# Patient Record
Sex: FEMALE | Race: WHITE
[De-identification: names, ages, dates, MRNs, and addresses within clinical notes are randomized per-mention and may not be internally consistent; named-entity substitution may affect disease eponyms.]

---

## 2019-07-13 NOTE — NUR
According to the Suicide Assessment the patient rates low on the suicide
screen she denies thoughts of SI, but does say she is tired and stressed.

## 2019-07-14 NOTE — NUR
DR. MARRUFO NOTIFIED AND REVIEWED PTS BEHAVIOR AND ASSESSMENT RESULTS. PT IS A
LOW RISK PER DR. MARRUFO. DR. MARRUFO STATED TO GIVE RESOURCES TO PT AT TIME
OF DISCHARGE. NO FURTHER ORDERS AT THIS TIME. RESOURCES REVIEWED WITH PT AND
SHE VERBALIZED UNDERSTANDING.

## 2019-07-16 NOTE — NUR
ASSESSMENT PER FLOW, PT REPORTS JUST GETTING OUT OF SHOWER, SALINE LOCK
CONVERTED TO IV, NS RESTARTED PER MD ORDERS, SEE EMAR, PT REPORTS FLATUS, BM
TODAY AND VOIDING WITH NO DIFFICULTY, PT REPORTS BACK PAIN 4/10, SCD'S
REAPPLIED AND WORKING PROPERLY, DENIES NEEDS, BED IN LOW POSITION, SIDE RAILS
X 2, CALL LIGHT IN REACH

## 2019-07-16 NOTE — NUR
PT RINGS CALL LIGHT. THIS RN TO BEDSIDE. PT REQUESTING ADDITIONAL BLANKETS.
TEMP TAKEN 102.3 NOTED. PT EDUCATION GIVEN AND NO ADDITIONAL BLANKETS
PROVIDED. WHILE AT BEDSIDE, PT ASSISTED W/GETTING OUT OF BED, UP TO BR. VOIDS
150ML URINE. RETURNS TO BED. RECONNECTED TO MONITORS. PULSE OX OBTAINED,
PT'S PULSE OX WHEN OFF O2 IS 97%. NC O2 OFF AT THIS TIME. PT MEDICATED W/500MG
TYLENOL PO. DENIES FURTHER NEEDS AT THIS TIME.

## 2019-07-16 NOTE — NUR
REQUESTED ORANGE JUICE. ORANGE JUICE AND CUP GIVEN. GETTING UP TO THE BATHROOM
WILL PUT BACK ON MONITORS PER ORDER WHEN FINISHED.

## 2019-07-17 NOTE — NUR
ADM TYLENOL 500 MG PER MD ORDERS, COLD PACKS PLACED, SCD'S CONTINUE ON AND
WORKING PROPERLY, FOB AT BEDSIDE

## 2019-07-17 NOTE — NUR
PT ON CALL LIGHT, PT UP TO BR, GAIT STEADY, VOIDED 200 MLS LIGHT YELLOW URINE,
REPORTS HAVING TO "PEE" MORE FROM DRINKING WATER, PT BACK TO BED, TEMP
OBTAINED, 98.8 AT THIS TIME, PT DISCONNECTED FROM EFM/TOCO, PT REQUESTED AND
SERVED SANDWICH TRAY, SCD'S RECONNECTED AND WORKING PROPERLY, DENIES FURTHER
NEEDS, FOB ASLEEP ON COUCH

## 2019-07-17 NOTE — NUR
PT ON CALL LIGHT, PT STATES "Y'ALL SAID THESE COULD COME OFF IN 20 MINUTES",
EFM/TOCO REMOVED, PT UP TO BR WITH ASSISTANCE, VOIDED 200 MLS OF LIGHT YELLOW
URINE BY SELF WITH NO DIFFICULTY, PT BACK TO BED, SCD'S RECONNECTED AND
WORKING PROPERLY, FROM 0430 TO 0453 - 3 MILD CTX,  WITH ACCELS NOTED,
PT DENIES FURTHER NEEDS, BED IN LOW POSITION, SIDE RAILS X 2, CALL LIGHT IN
REACH, FOB ASLEEP ON COUCH

## 2019-07-17 NOTE — NUR
PT ON CALL LIGHT, PT UP TO BR WITH ASSISTANCE, VOIDED 250 MLS OF LIGHT YELLOW
URINE BY SELF WITH NO DIFFICULTY, PT BACK TO BED, PT REPORTS "FEELING COLD",
OBTAINED TEMP, TEMP 99.5, INFORMED PT THAT I WAS GOING TO PUT THE EFM/TOCO ON
HER AND PT REFUSED IT AT THIS TIME, STATES "THE DOCTOR TOLD ME ANYTHING UNDER
100 IS NOT A TEMP", TRIED TO EXPLAIN TO PT, BUT PT STILL REFUSES AT THIS TIME,
DENIES FURTHER NEEDS, SCD'S RECONNECTED AND WORKING PROPERLY, BED
IN LOW POSITION, SIDE RAILS X 2, CALL LIGHT IN REACH, FOB ASLEEP ON COUCH

## 2019-07-17 NOTE — NUR
DR PAREKH NOTIFIED, REPORT OF LUNG SOUNDS, TEMP, RESP, O2 SAT AND PLACED ON
2L O2 VIA NC, ORDERS TO DRAW BLOOD CULTURES, PLACED COLD PACKS, ADM TYLENOL
500 MG PO NOW, DC TYLENOL 500 MG Q6HPRN, AND CHANGE TO TYLENOL 1000MG PO
Q6HPRN FOR FEVER

## 2019-07-17 NOTE — NUR
PT ON CALL LIGHT, PT UP TO BR WITH ASSISTANCE, GAIT STEADY, VOIDED 500 MLS OF
YELLOW URINE BY SELF WITH NO DIFFICULTY, PT BACK TO BED, VS OBTAINED, EFM/TOCO
APPLIED, SCD'S RECONNECTED, PT POSITIONED TO LEFT SIDE WITH PILLOWS BEHIND
AROUND HER FOR COMFORT AND SUPPORT, PT DENIES FURTHER NEEDS

## 2019-07-17 NOTE — NUR
MARY SUE, RN REPORTS THAT PT'S TEMP .8, RESP 24-26, O2 SAT 94%, PT
PLACED ON 2L OF O2 VIA NC, LUNGS CLEAR WITH RIGHT LOWER DIMINISHED, WILL
NOTIFY DR PAREKH

## 2019-07-17 NOTE — NUR
PT ON CALL LIGHT, PT UP TO BR, GAIT STEADY, EMPTIED 500 MLS OF LIGHT
YELLOW URINE, PT REPORTS VOIDING EARLIER, PT VOIDED 500 MLS OF LIGHT YELLOW
URINE BY SELF WITH NO DIFFICULTY, PT BACK TO BED, SCD'S RECONNECTED AND
WORKING PROPERLY, EFM/TOCO RECONNECTED, PT DENIES FURTHER NEEDS

## 2019-07-18 NOTE — NUR
THIS RN TO BEDSIDE TO ADMINISTER REQUESTED AND SCHEDULED MEDS. PT NOW
REQUESTING TO GET UP TO AMBULATE IN THE HALLS. SCHEDULED PEPCID AND LOVENOX
INJECTION ADMINISTERED. SEE EMAR. PT THEN ASSISTED W/BEINGA BLE TO AMBULATE.
O2 TANK PROVIDED. O2 TUBING CONNECTED AND SET TO 6L. PT AMBULATES IN [place]
W/SIG OTHER. PT RETURNS PROMPTLY TO ROOM TO VOID AND THEN RETURNS TO HALLS TO
AMBULATE. PT HAS STEADY GATE AND DENIES FEELING DIZZY OR WEAK. WHILE PT IS UP
TO AMBULATE. BED LINENS CHANGED AND ROOM CLEANED AND STRAIGHTENED. HOUSE
KEEPING REMOVED TRASH AND LINEN.

## 2019-07-18 NOTE — NUR
PT RETURNS TO ROOM. REPORTS TOLERATED AMBULATING WELL. PT REPORTS SHE
AMBULATED TO WS DOOR WAY AND THEN TO VENDING MACHINES. PT RETURNS TO BED.
RECONNECTED TO FETAL MONITORS. SEE [facility] FOR FETAL TRACING. PT NOW
REQUEST AMBIEN AND FRESH ICE WATER. ICE WATER SERVED.

## 2019-07-18 NOTE — NUR
REC'D PT AA&O X 4. PAIN ASSESSED. PT REPORTS CONSTANT BACK PAIN. RATES PAIN
3/10. NO REQUEST FOR PAIN INTERVENTIONS AT THIS TIME. SEE FLOWSHEET FOR SHIFT
ASSESSMENT. BREATH SOUNDS CLEAR W/DIMINISHED BREATH SOUNDS NOTED TO LOWER RT
LOBE. BOWEL SOUNDS PRESENT. NO EDEMA NOTED. SCD WRAPS IN PLACE BILATERALLY.
CONNECTED TO PUMP AND PUMP IS ON AND FUNCTIONING. IV SITE TO LEFT WRIST. WNL.
PATENT W/NS INFUSING AT 50ML/HR. PM SHIFT POC DISCUSSED. PT VEBALZIES
UNDERSTANDING AND AGREEABLE. LOVENOX TEACHING PROVIDED AND QUESTIONS
ANSWERED.ADDITIONAL COMMENTS IN [facility]. BED LOW, SIDE RAILS UPX 2. CALL
LIGHT AND PHONE AT PT'S SIDE.

## 2019-07-18 NOTE — NUR
ROUNDS MADE. PT RESTING QUIETLY TO LEFT SIDE W/EYES CLOSED. OPENS EYES
SPONTANEOUSLY WITH VERBAL STIMULUS. EFM REPOSTIIONED FOR CONTINUOS FETAL
TRACING. NO NEEDS VOICED AT [facility]. NO C/O PAIN.

## 2019-07-18 NOTE — NUR
ROUNDS MADE. PT SITTING UP IN BED TALKING W/SIG OTHER. PAIN AND NEEDS
ASSESSED. NO PAIN INTERVENTIONS REQUESTED AT THIS TIME. PT REQUESTS HER AMBIEN
NOW.PT INFORMED THAT SHE HAS OTHER MEDS SCHEDULED AT 2100 AND IT WILL BE
BROUGHT AT THAT TIME. PT IS AGREEABLE. NO FURTHER NEEDS AT THIS TIME.

## 2019-07-18 NOTE — NUR
IV SITE WNL. VANCOMYCIN 1.25GM UP TO INFUSE AT 250ML/HR. AMBIEN 10MG PO GIVEN.
PT DENIES FURTHER NEEDS AT THIS TIME.

## 2019-07-19 NOTE — NUR
LINEN CHANGE DONE AND CLEAN GOWNS PLACED IN ROOM FOR PT TO CHANGE INTO
FOLLOWING SHOWER. SCD SLEEVES NOTED TO BE SOILED AND CLEAN ONES PROVIDE. CLEAN
NON SKID SOCKS ALSO PROVIDED. LINENS PROVIDED FOR SIGNIFICANT OTHER. PT
CONTINUES TO AMBULATE ON UNIT. CURRENTLY IN FRONT OF NBN WINDOW. TOLERATING
ACTIVITY WELL, DENIES SOB AND DIZZINESS. REINFORCE NOT LEAVING UNIT WHILE
AMBULATING, VERBALIZES UNDERSTANDING. CONTINUE TO NOTE STEAY GAIT.

## 2019-07-19 NOTE — NUR
ROUNDS MADE. PT LYING TO RT SIDE. RESP COUNT DONE. RESP 18. SNORING AUDIBLE.
PT LEFT UNDISTURBED AT THIS TIME.

## 2019-07-19 NOTE — NUR
OUT OF SHOWER. PT STATES THAT SHE IS GOING TO WALK TO HER CAR AND MAKE SURE IT
IS LOCKED. PT EDUCATED THAT SHE COULD AMBULATE ON UNIT BUT CAN NOT LEAVE UNIT
FOR AMBULATION WITHOUT STAFF MEMBER AND DEPENDING ON CENUS OF UNIT STAFF MAY
NOT BE AVAILABLE TO ACCOMPANY HER OFF UNIT. BECOMES TEARFUL WHEN GETTING BACK
IN BED AND STATES THAT SHE "JUST NEEDS 2-3 PUFFS OF A CIGARETTE TO HELP ME
CALM DOWN." REINFORCED SMOKING POLICY AS WELL EFFECTS OF SMOKING WHILE PREG
AND WITH PNEUMONIA, CONTINUES TO BE TEARFUL AND ASKS RN WHEN SHE CAN AMBULATE.
REINFORCED THAT SHE CAN AMBULATE WITH RN WHEN REACTIVE NST IS OBTAINED AS LONG
AS STAFFING PERMITS, VERBALIZES UNDERSTANDING. REFUSES SCD'S AT THIS TIME. BED
IN LOW POSITION WITH UPPER SIDE RAILS RAISED X2. CALL LIGHT AND PHONE WITHIN
REACH.

## 2019-07-19 NOTE — NUR
vancomycin 1.25gm up to infuse at 250ml/hr per orders. pt currently sitting up
in bed using cell phone. no needs voiced.

## 2019-07-19 NOTE — NUR
ROUNDS MADE. PT RESTING QUIETLY W/EYES CLOSED. OPENS EYES SPONTANEOUSLY
W/ENTRY TO THE ROOM RESP EVEN AND UNLABORED. NO COMPLAINTS OR REQUEST AT THIS
TIME.

## 2019-07-19 NOTE — NUR
xray to room at this time. request pt take bra off. this rn to bedside to
assist pt w/doing so. pt sitting up in bed. begins having a coughing spell.
able to cough og clear sputum. after several minutes, is up to br.

## 2019-07-19 NOTE — NUR
ROUNDS MADE. UPON ENTER ROOM, PT IS AA&O X 4 USING CELL PHONE. NO COMPLAINTS
AT THIS TIME. VITAL SIGNS OBTAINED AND CHARTED. PT IS AFEBRILE.

## 2019-07-19 NOTE — NUR
25 MG IM VISTARIL GIVEN IN RIGHT VENTROGLUTEAL. EDUCATED ON MEDICATION,
VERBALIZES UNDERSTANDING. 10 MG PO AMBIEN AND 20 MG PO PEPCID GIVEN PER ORDER.
1.25 GRAM VANC HUNG TO INFUSE OVER 1 HOUR PER ORDER TO RT FA PIV, INFUSING
WELL WITH NO S/S OF INFILTRATION NOTED. LOVENOX GIVEN TO RLL ABD QUADRANT.
CURRENTLY ON RA, SPOT CHECK FOR O2 SAT 97%. CELL PHONE IN HAND. REFUSES SCD'S.
REQUEST THAT V/S BE CHECKED WITH 0100 RT TREATMENT OR WHEN IV ANBX COMPLETES
SO SHE CAN SLEEP. RN ENSURED THAT THIS COULD BE DONE UNLESS NEED AROSE PRIOR
TO THESE TIMES, VERBALIZES UNDERSTANDING. BED IN LOW POSITION WITH UPPER SIDE
RAILS RAISED X2. CALL LIGHT AND PHONE WITHIN REACH. WILL CONTINUE TO MONITOR.

## 2019-07-19 NOTE — NUR
RT FA PIV FLUSH FOLLOWING ANBX COMPLETED AND PIV SL. NO S/S OF INFILTRATION
NOTED. UP TO BATHROOM TO VOID, 500 MLS CLEAR LIGHT YELLOW URINE NOTED IN HAT.
SMALL SOFT FORMED BM ALSO AT THIS TIME. O2 SAT ON RA FOLLOWING GETTING UP TO
BR 91%. PLACED ON 1 L O2 VIA HIGH FLOW NC WITH INCREASE OF O2 SAT TO 96-97%.
PT LAYING BACK IN SEMI-FOWLERS POSITION LOOKING ON CELL PHONE AT THIS TIME. O2
MONITOR OFF. DENIES NEEDS. O2 LEFT IN PLACE. REFUSES SCD'S. BED IN LOW
POSITION WITH UPPER SIDE RAILS RAISED X2. CALL LIGHT AND PHONE WITHIN REACH.
WILL CONTINUE TO MONITOR.

## 2019-07-19 NOTE — NUR
OOB AT 0045 TO GO TO BATHROOM. VOIDED 600 MLS CLEAR LIGHT YELLOW URINE IN HAT.
SMALL SOFT FORMED BM NOTED. UNSTEADY GAIT NOTED REQUIRING MINIMAL ASSIST.
EFM/TOCO AND SCD'S PLACED. DENIES NEEDS AT THIS TIME. RT AT BEDSIDE FOR
ORDERED UPDRAFT. BED IN LOW POSITION WITH UPPER SIDE RAILS RAISED X2. CALL
LIGHT AND PHONE WITHIN REACH.

## 2019-07-19 NOTE — NUR
WHILE AT BEDSIDE PT'S PULSE HAS DECREASED TO 93% WHILE ON 2L. O2 FLOW
INCREASED TO 5L TO MAINTAIN 95%. PT'S RESP RATE INCREASED AT THIS TIME TO 36
BREATHS PER MINUTE. NO C/O OF DIFFICULTY BREATHING. PT COUGHS UP BLOOD TINGED
YELLOWISH SPUTUM.

## 2019-07-19 NOTE — NUR
VANC INFUSION COMPLETED. NS SET TO INFUSE TO FLUSH IV LINE. PT RESTING WITH
EYES CLOSED AT THIS TIME. RESP 19, NO S/S OF DISTRESS NOTED. BED IN LOW
POSITION WITH UPPER SIDE RAILS RAISED X2. CALL LIGHT AND PHONE WITHIN REACH.
WILL CONTINUE TO MONITOR AND ASSIST PRN.

## 2019-07-19 NOTE — NUR
BEDSIDE REPORT REC'D FROM RAYSHAWN LOPEZ. PT REC'D SITTING ON EDGE BED, LOOKING
AT CELL PHONE WITH FLAT AFFECT, CAREGIVERS INTRODUCED, PT ASK TO SHOWER AND
BECOMES TEARFUL. REPORTS TO RN THAT SHE IS "STRESSED" D/T MONEY ISSUES AND
CONCERNS WITH LEAVE FROM WORK AND THAT SHE WON'T BE ABLE TO RETURN TO WORK
BEFORE HAVING INFANT, THEN NOT BEING ABLE TO TAKE TIME OFF AFTER HAVING INFANT
FOR BONDING. PT ALSO REPORTS THAT SHE AND HER SIGNIFICANT OTHER HAVE BEEN
ARGUING "ALL DAY OVER ME NEEDING THINGS FOR THE BABY THAT WE DON'T HAVE AND
HE WON'T GIVE ME ENOUGH MONEY TO BUY THE THINGS THAT WE NEED." PT SOBBING AT
TIMES AND THIS RN UNABLE TO UNDERSTAND WHAT PT WAS SAYING, ENCOURAGED PT TO
TAKE SLOW DEEP BREATHS AND TO CALM DOWN. STATES THAT SHE CAN'T CALM DOWN AND
IS NEEDING TO SMOKE A CIGARETTE. O2 READING 93-97%, CURRENTLY ON RA AND
REFUSES TO REPLACE O2. RN ATTEMPTS TO CALM PT ONCE AGAIN AND ONCE CALM PT ALSO
REPORTS THAT SHE IS IN A CUSTODY HILTON WITH HER EX  OVER HER DAUGHTER
AND HE IS TRYING TO GAIN FULL CUSTODY OF HER, BEGINS TO SOB UNCONTROLLABLY AT
THIS TIME. COOL CLOTH OFFERED AND ENCOURAGED SLOW DEEP BREATHING. REPORTS THAT
SHE FEELS ISOLATED TO ROOM D/T HAVING TO BE CONTINUALLY MONITORED. ATTEMPTED
TO DISCUSS PLAN OF CARE FOR WEANING O2, AMBULATING WITH PORTABLE O2 TANK ON
UNIT, AND THAT EFM/TOCO ARE NO LONGER CONTINUOUS. SHIFT ASSESSMENT COMPLETED
WHILE PT CALM AT 1932 PER FLOW SHEET. DIMINISHED BREATH SOUNDS THROUGHOUT ALL
FEILDS BILATERALLY WITH INSPIRATORY AND EXPIRATORY WHEEZES NOTED TO RLL.
PRODUCTIVE COUGH OF CLEAR TO LIGHT YELLOW THICK SPUTUM NOTED.

## 2019-07-19 NOTE — NUR
PT NOT IN ROOM OR AMBULATING ON UNIT. RN OFF UNIT TO FIND PT. SIGNIFICANT
OTHER COMES TO RN AT INFORMATION DESK AND REPORTS THAT HE WAS TRYING TO GET PT
TO COME BACK TO ROOM AND SHE BECAME UPSET AND WILL NOT LISTEN TO HIM. RN TO PT
AT [place] AND PT ESCORTED BACK TO ROOM. REINFORCED WITH PT IMPORTANCE AND
SAFETY OF REMAINING ON UNIT WHEN AMBULATING WITH O2 AND IV ACCESS. STATES THAT
SHE JUST HAD TO GET OUT OF THE HOSPITAL D/T FEELING ISOLATED IN ROOM. REPORTS
THAT SHE AND SIGNIFICANT BEGAN TO ARGUE OVER FINANCES WHILE AMBULATING. O2
SPOT CHECK WITH O2 SAT 97%. PT ASSISTED TO SHOWER.

## 2019-07-19 NOTE — NUR
JABARI IN LAB NOTIFIED OF NEED FOR ORDERED LABS AT 0000. PER JABARI ALL LABS
CAN BE ADDED ON TO BLOOD THAT WAS DRAWN AT 1835 IF PT HAD NOT REC'D MEDS.
SPOKE WITH TELMA SKELTON RN WHO REPORTS THAT PT HAD NOT REC'D ANY MEDS OTHER
THAN ANBX AND AMBIEN, ADD LABS ON INSTEAD OF DRAWING AT THIS TIME.

## 2019-07-20 NOTE — NUR
DR PAREKH ROUNDS ON PT,  DISCUSSED PLAN OF CARE, QUESTIONS ANSWERED.  PT OFF
EFM/TOCO MONITORING AT THIS TIME, ENCOURAGED TO AMBULATE IN HALLS.

## 2019-07-20 NOTE — NUR
ROUNDS COMPLETED, PT RESTING IN BED LEFT LATERAL TILT POSITION, SIGNIFICANT
OTHER PRESENT IN ROOM, RESP EVEN AND UNLABORED, CALL LIGHT IN EASY REACH, NAD
NOTED.  CONTINUE TO MONITOR.

## 2019-07-20 NOTE — NUR
ROUNDS COMPLETED, RESP EVEN AND UNLABORED, SLEEPING ON ENTRY TO ROOM, ROUSES
TO VERBAL STIMULI, ENCOURAGED AMBULATION IN ROOM OR HALLWAYS, INCENTIVE
SPIROMETER USE WITH PT DEMONSTRATING UP TO 1250 ML INSPIRED VOLUME WITH
ENCOURAGEMENT, + WET COUGH WITH SPUTUM PRODUCTION. PULSE OX 94-95% ON ROOM
AIR-MOUTH BREATHER. UP OOB TO BR TO VOID.  NO OTHER NEEDS VOICED.  CALL LIGHT
IN EASY REACH, WILL MONITOR.

## 2019-07-20 NOTE — NUR
ROUNDS COMPLETED, PT LYING LEFT LATERAL POSITION, RESP EVEN AND UNLABORED AT
REST, HOB ELEVATED 30 DEGREES.  SALINE LOCKED IV.  O2 OFF.  PULSE OX 96% ON RA
WHILE IN BED, INCREASES TO 97-98% WITH SITTING UP IN BED OR ACTIVITY. CONTINUE
TO MONITOR.  NO NEEDS VOICED, CALL LIGHT IN EASY REACH.

## 2019-07-20 NOTE — NUR
ROUNDS COMPLETED.  MEDS GIVEN WITH SIPS WATER.  SPOT CHECK PULSE OX 96% ON
1L/MIN NC.  ENCOURAGED USE OF INCENTIVE SPIROMETRY.  STATES UNDERSTANDING.
CALL LIGHT IN EASY REACH.

## 2019-07-20 NOTE — NUR
DR PAUL HERE TO MAKE ROUNDS, STATES OKAY FOR PT TO HAVE NS NASAL SPRAY FOUR
TIMES DAILY FOR NASAL CONGESTION.  VERIFIED BY READBACK.

## 2019-07-20 NOTE — NUR
REACTIVE NST NOTED, MONITORS OFF. RT REMAINS AT BEDSIDE COMPLETING UPDRAFT. PT
DENIES NEEDS. BED IN LOW POSITION WITH UPPER SIDE RAILS RAISED X2. CALL LIGHT
AND PHONE WITHIN REACH. SIGNIFICANT OTHER REMAINS OUT OF ROOM. No

## 2019-07-20 NOTE — NUR
ROUNDS COMPLETED, IVPB ROCEPHIN HUNG AND INFUSING AT 100ML/HR PER PIV SALINE
LOCK TO RIGHT FOREARM WITHOUT DIFFICULTY, VSS, AFEBRILE, LUNCH TRAY PREPPED
AND PT ENCOURAGED TO EAT.  EFM/TOCO APPLIED TO MATERNAL ABDOMEN AT 1232, ABD
SOFT AND NONTENDER.  WILL MONITOR.

## 2019-07-20 NOTE — NUR
UP TO SHOWER, DENIES DIZZINESS, LIGHTHEADEDNESS AND DROWSINESS. LINENS
CHANGED. SIGNIFICANT OTHER REMAINS IN ROOM AND VERBALIZES UNDERSTANDING OF
CALL LIGHT USE IF NEEDED. WILL CONTINUE TO MONITOR.

## 2019-07-20 NOTE — NUR
ROUNDS COMPLETED, NAD NOTED, PT CONTINUES TO LIE IN BED, RESP EVEN AND
UNLABORED, O2 OFF.  ENCOURAGED AMBULATION IN ROOM OR IN HALLWAYS.  CALL LIGHT
IN EASY REACH.

## 2019-07-20 NOTE — NUR
LAYING ON RT SIDE RESTING WITH EYES CLOSED. RESP 17, REGULAR RATE AND RYMTHM
NOTED, NO S/S OF DISTRESS NOTED. O2 REMAINS ON AT 1 L/MIN VIA HIGH FLOW NC.
BED IN LOW POSITION WITH UPPER SIDE RAILS RAISED X2. CALL LIGHT AND PHONE
WITHIN REACH. WILL CONTINUE TO MONITOR.

## 2019-07-20 NOTE — NUR
AM ASSESSMENT COMPLETED, PT RESP EVEN BUT SHALLOW, SOB WITH ACTIVITY, COARSE
BREATH SOUNDS TO RIGHT UPPER, MIDDLE LOBES AND DIMINISHED TO LOWER LOBE.
INCENTIVE SPIROMETRY TO 1250ML WITH ENCOURAGEMENT, +YELLOW AND BLODO TINGED
SPUTUM PRODUCED.  TOLERATES PO INTAKE, VOICES EXCESSIVE FATIGUE AND MALAISE,
HEART RRR, ABD SOFT NONTENDER, GRAVID.  VOIDING WITHOUT DIFFICULTY, MEDINA
FREELY, NEGATIVE NEDA'S SIGN.  REFUSES SCD'S.  O2 AT 2L/MIN, DECREASING TO
1L/MIN PER NC-97% AT THIS TIME.  PT VOICES THAT SHE HAS HAD +HEMOPTYSIS WITH
BRONCHITIS SYMPTOMS OFF AND ON OVER THE PAST YEAR ON INQUIRY.  REVIEWED PLAN
OF CARE TODAY, QUESTIONS ANSWERED, CALL LIGHT IN EASY REACH, BED IN LOW
POSITION, BED BRAKES LOCKED.  SIDE RAILS UP X2.  CONTINUE TO MONITOR.

## 2019-07-20 NOTE — NUR
AMBULATORY IN HALLS WITH SIGNIFICANT OTHER.  INSTRUCTED TO STAY ON WOMEN'S
UNIT, STATES UNDERSTANDING AND INTENT TO COMPLY.

## 2019-07-20 NOTE — NUR
PLAYING CARDS WITH SIGNIFICANT OTHER AT THIS TIME. VANC, AMBIEN, PEPCID, AND
NASAL SPRAY GIVEN PER ORDER. DENIES PAIN AND NEEDS AT THIS TIME. BED IN LOW
POSITION WITH UPPER SIDE RAILS RAISED X2. CALL LIGHT AND PHONE WITHIN REACH.
WILL CONTINUE TO MONITOR. CONTINUES TO REFUSE SCD'S.

## 2019-07-20 NOTE — NUR
RESTING QUIETLY IN SEMI-FOWLERS POSITIONS. RESPIRATIONS 19, REGULAR RYMTHM, NO
S/S OF DISTRESS NOTED. BED IN LOW POSITION WITH UPPER SIDE RAILS RAISED X2.
CALL LIGHT AND PHONE WITHIN REACH. WILL CONTINUE TO MONITOR.

## 2019-07-20 NOTE — NUR
EFM/TOCO PLACED FOR SHIFT NST. MONITOR EXPLAINED. REQUESTING BREATHING
TREATMENT, SPOKE WITH RUSSELL IN RT WHO REPORTS SHE WILL BE IN ROUTE TO UNIT
WHEN SHE COMPLETES CURRENT UPDRAFT THAT SHE IS PERFORMING ON MED II. PT
NOTIFIED AND VERBALIZES UNDERSTANDING. REQUESTS SODA AND PROVIDED PER REQUEST.
BED IN LOW POSITION WITH UPPER SIDE RAILS RAISED X2. CALL LIGHT AND PHONE
WITHIN REACH. WILL CONTINUE TO MONITOR.

## 2019-07-20 NOTE — NUR
VANC INFUSION COMPLETED. RT FA PIV SL. DENIES NEEDS AT THIS TIME. PLAYING
CARDS WITH SIGNIFICANT OTHER. BED IN LOW POSITION WITH UPPER SIDE RAILS RAISED
X2. CALL LIGHT AND PHONE WITHIN REACH. WILL CONTINUE TO MONITOR AND ASSIST
PRN.

## 2019-07-20 NOTE — NUR
ROUNDS COMPLETED, PT RESTING WITH EYES CLOSED.  RESP EVEN AND UNLABORED,
DINNER TRAY AT BS.  SIGNIFICANT OTHER TO ROOM.  NO NEEDS VOICED AT THIS TIME.
CONTINUE TO MONITOR.

## 2019-07-20 NOTE — NUR
RT FA PIV FLUSHED, FLUSHES WITHOUT DIFFICULTY, NO S/S OF INFILTRATION NOTED.
1.25 GM VANC HUNG TO INFUSE PER ORDER. VSS. O2 REMAINS AT 1L VIA HIGH FLOW NC.
LAYING ON LEFT SIDE RESTING WITH EYES CLOSED. DENIES NEEDS. CONTINUES TO
REFUSE SCD'S. BED IN LOW POSITION WITH UPPER SIDE RIALS RAISED X2. CALL LIGHT
AND PHONE WITHIN REACH. WILL CONTINUE TO MONITOR.

## 2019-07-20 NOTE — NUR
PHONED DR PAREKH TO UPDATE ON EFM STATUS, WILL LEAVE PT ON EFM/TOCO MONITORS
UNTIL HE ARRIVES.  CONTINUE TO MONITOR.

## 2019-07-20 NOTE — NUR
ROUNDS COMPLETED, CUP OF WATER AND ORANGE JUICE PROVIDED UPON REQUEST.  NO
OTHER NEEDS VOICED AT THIS TIME. CONTINUE TO MONITOR.

## 2019-07-20 NOTE — NUR
CUP OF ICE PROVIDED PER PT REQUEST. DENIES ADDITIONAL NEEDS AND PAIN. SITTING
IN HIGH FOWLERS POSITION WATCHING TV AND PLAYING ON CELL PHONE. BED IN LOW
POSITION WITH UPPER SIDE RAILS RAISED X2. CALL LIGHT AND PHONE WITHIN REACH.
SCD'S REFUSED, STATES THAT SHE WILL CALL IF LEG CRAMPS BEGIN SINCE THEY ARE
FOR PAIN, EDUCATED THAT SCD'S ARE FOR PREVENTION OF BLOOD CLOTS, PT CONTINUES
TO INSIST THAT MD ORDERED THEM FOR LEG PAIN.

## 2019-07-20 NOTE — NUR
LAYING IN BED ON LEFT SIDE WITH SIGNIFICANT OTHER. SIGNIFICANT OTHER OOB AND
ROOM WHEN RN COMES TO BEDSIDE. VSS WITH O2 SAT RANGING FROM 93-96% ON RA, PT
NOTED TO HOLD BREATH DURING PERIODS OF MONITORING AND INSTRUCTED TO BREATH,
EDUCATED THAT WHEN SHE HOLDS HER BREATH OXYGEN EXCHANGE IS DECREASED,
VERBALIZES UNDERSTANDING. SHIFT ASSESSMENT COMPLETED PER FLOWSHEET. BREATH
SOUNDS IMPROVED FROM THIS RN'S PREVIOUS ASSESSMENT 07/19/19. BREATH SOUNDS
DIMINISHED THROUGHOUT ALL LUNG FEILDS WITH EXPIRATORY WHEEZES AND FINE
CRACKLES NOTED RRL. CRACKLES CLEAR WITH COUGH. PRODUCTIVE COUGH NOTED WITH
CLEAR THIN SPUTUM. EDUCATED ON IMPORTANCE OF COUGH AND DEEP BREATHING, USE OF
FLUTTER AND INCENTIVE SPIROMETER. REPORTS THAT SHE IS PERFORMING WITH RT AND
REFUSES TO PERFORM AT THIS TIME. REFUSES SCD'S STATING THAT HER LEGS ARE NOT
HURTING, EDUCATED THAT SCD'S ARE USED TO PREVENT FORMATION OF BLOOD
CLOTS/DVT'S, STATES "NO THEY AREN'T HE ORDERED THEM FOR PAIN CONTROL BECAUSE
MY LEGS WERE KILLING ME WHEN I CAME IN." CONTINUES TO REFUSE. RT FA PIV
FLUSHED WITH 10 MLS NS WITHOUT DIFFICULTY, NO S/S OF INFILTRATION NOTED,
TOLERATED WELL. PLAN OF CARE DISCUSSED WITH PT, VERBALIZES UNDERSTANDING. UP
TO BATHROOM TO VOID, WILL CALL RN TO ROOM WHEN BACK TO BED FOR NST TO BE
COMPLETED. BED IN LOW POSITION AND CALL LIGHT PLACED ON PT'S BEDSIDE TABLE FOR
EASY ACCESS WHEN BACK TO BED. DENIES NEEDS.

## 2019-07-21 NOTE — NUR
REACTIVE NST NOTED. MONITORS OFF. WATCHING TV AT THIS TIME. SIGNIFICANT OTHER
NOT IN ROOM. STATES THAT SHE WILL SHOWER WHEN HE RETURNS TO ROOM. INSTRUCTED
TO NOTIFY RN SO LINENS COULD BE CHANGED. REQUESTS AMBIEN NOW, EDUCATED THAT
SHE WOULD NOT BE ABLE TO SHOWER AFTER RECEIVING AMBIEN D/T INCREASING RISK FOR
FALL, VERBALIZES UNDERSTANDING AND REQUESTS THAT SHE GET MED FOLLOWING SHOWER.
DENIES NEEDS. BED IN LOW POSITION WITH UPPER SIDE RAILS RAISED X2. CALL LIGHT
AND PHONE WITHIN REACH. WILL CONTINUE TO MONITOR.

## 2019-07-21 NOTE — NUR
AMBULATORY ON UNIT WITH SIGNIFICANT OTHER. NON SKID SOCKS ON. STEADY GAIT
NOTED. QUESTIONS REGARDING DIET RESTRICTION ANSWERED. PT INFORMED THAT SHE HAS
NO DIETARY RESTRICTIONS AND CAN EAT AND DRINK ANYTHING THAT SHE WOULD LIKE.
VEBALIZES UNDERSTANDING.

## 2019-07-21 NOTE — NUR
LAYING ON LEFT SIDE RESTING WITH EYES CLOSED. RESP REGULAR AND UNLABORED, NO
S/S OF DISTRESS NOTED. RESP 17, REGULAR RYMTHM NOTED. NO S/S OF DISTRESS
NOTED. SIGNIFICANT OTHER RESTING ON COUCH AT BEDSIDE. BED IN LOW POSITION WITH
UPPER SIDE RAILS RAISED X2. CALL LIGHT AND PHONE WITHIN REACH. WILL CONTINUE
TO MONITOR.

## 2019-07-21 NOTE — NUR
LAYING ON LEFT SIDE RESTING WITH EYES CLOSED. EASILY AROUSES TO VOICE. VSS.
DENIES NEEDS. 700 MLS EMPTIED FROM HAT. BED IN LOW POSITION WITH UPPER SIDE
RAILS RAISED X2. CALL LIGHT AND PHONE WITHIN REACH. WILL CONTINUE TO MONITOR
AND ASSIST PRN.

## 2019-07-21 NOTE — MORECARE
CASE MANAGEMENT DISCHARGE SUMMARY
 
 
PATIENT: JANINE OTT                UNIT: [id number]
ACCOUNT#: [id number]                       ADM DATE: 19
AGE: 23     : [birth year]  SEX: F            ROOM/BED: [facility]    
AUTHOR: NAIN TEJEDA                             PHYSICIAN:                               
 
REFERRING PHYSICIAN: IAIN RASHEED MD        
DATE OF SERVICE: 19
Discharge Plan
 
 
Patient Name: JANINE OTT
Facility: [facility]:[place]
Encounter #: [id number]
Medical Record #: [id number]
: 1995
Planned Disposition: 
Anticipated Discharge Date: 
 
Discharge Date: 
Expected LOS: 
Initial Reviewer: [email or web address]
Initial Review Date: 2019
Generated: 19   2:53 pm 
  
 
 
 
 
 
 
 
Patient Name: JANINE OTT
 
Encounter #: [id number]
Page [id number]
 
 
 
 
 
Electronically Signed by NAIN TEJEDA on 19 at 1353
 
 
 
 
 
 
**All edits/amendments must be made on the electronic document**
 
DICTATION DATE: 19 1353     : ARIAS  19 1353     
RPT#: [id number]                                DC DATE:        
                                               STATUS: ADM IN  
[facility]
[address] [place] [address]
***END OF REPORT***

## 2019-07-21 NOTE — NUR
DRINKING COKE AND EATING CANDY PURCHASED FROM VENDING MACHINE. AMBULATED BACK
TO ROOM WITH RN AND SIGNIFICANT OTHER. NO SOB WITH ACTIVITY NOTED. STEADY
GAIT.

## 2019-07-21 NOTE — NUR
REPORTS FEELING ANXIOUS D/T UPCOMING CUSTODY HEARING WITH OLDER CHILD. STATES
THAT SHE CAN'T FALL ASLEEP EVEN AFTER AMBIEN AND SHOWER. VISTARIL 25 MG IM
GIVEN TO RIGHT VENTROGLUTEAL. VSS. REMAINS ON RA. SIGNIFICANT OTHER AT
BEDSIDE, SUPPORTIVE AND ATTENTIVE TO PT.

## 2019-07-21 NOTE — NUR
OUT OF SHOWER, REMAINS IN BATHROOM DRESSING AND REPORTS TO RN THAT SHE HAS TO
USE THE BATHROOM BEFORE COMING OUT. DENIES NEEDS AND STATES THAT SHE WILL USE
BATHROOM CALL LIGHT IF ASSISTANCE IS NEEDED.

## 2019-07-21 NOTE — NUR
AMBULATORY BACK TO ROOM WITH PT. V/S TAKEN. O2 SAT 96% ON RA FOLLOWING
ACTIVITY. B/P 141/86. PT REPORTS THAT SHE IS WORRIED ABOUT PAYING FOR RENT
DURING THAT TIME THAT SHE WILL HAVE TO BE OFF OF WORK FOLLOWING HAVING INFANT.
ASSURED HER THAT CM WOULD BE COMING TO SEE HER PRIOR TO GOING HOME AND COULD
DISCUSS RESOURCES WITH HER, VERBALIZES UNDERSTANDING. SIGNIFICANT OTHER
REMAINS AT BEDSIDE, SUPPORTIVE AND ATTENTIVE TO PT AND HER NEEDS. REFUSES
SCD'S.

## 2019-07-21 NOTE — NUR
UPDRAFT COMPLETED WITH RT. SHIFT ASSESSMENT COMPLETED AT THIS TIME PER
FLOWSHEET. VSS. O2 SAT RANGING FROM 93-97% ON RA WITH PT CONVERSING WITH RN.
400 MLS YELLOW URINE EMPTIED FROM HAT AND REINFORCED WITH PT NOTIFING RN OF
VOIDS FOR ACCURATE I&O, VERBALIZES UNDERSTANDING. RLL WITH COARSE CRACKLES
THAT BECOME FINE CRACKLES AFTER COUGH. PRODUCTIVE COUGH WITH SCANT HEMOPTYSIS
NOTED, MD AWARE. C/O PAIN TO RT FA PIV SITE, REDNESS AND SWELLING NOTED, PIV
REMOVED, TIP INTACT. WILL RESITE. PT REQUEST TO SHOWER PRIOR TO HAVING PIV
RESITED. QUESTIONS ANSWERED ABOUT WHERE SHE AMBULATE, REINFORCED WITH PT AND
SIGNIFICANT OTHER THAT SHE COULD AMBULATE ON UNIT BUT NOT OFF OF UNIT WITHOUT
STAFF AND STAFF AVAILABILITY DEPENDED ON UNIT CENSUS AND PT ACCURITY ON UNIT,
VERBALIZES UNDERSTANDING. DENIES PAIN. REQUEST TO AMBULATE PRIOR TO NST.
STEADY GAIT NOTED. OUT OF ROOM AMBULATING WITH SIGNIFICANT OTHER AT 1943. WILL
CONTINUE TO MONITOR.

## 2019-07-21 NOTE — NUR
PT TO DESK REQUESTING TO GO TO VENDING MACHINE. THIS RN OFF UNIT TO VENDING
AREA WITH PT AND SIGNIFICANT OTHER. STEADY GAIT NOTED, NO DYSPNEA WITH
ACTIVITY NOTED.

## 2019-07-21 NOTE — NUR
SIGNIFICANT OTHER TO DESK, REPORTS THAT PT IS OUT OF BR AND IN BED. RN TO
BEDSIDE. 20 MG PO PEPCID, NASAL SPRAY, AND 10 MG PO AMBIEN GIVEN PER ORDER. 18
G IV PLACED TO LEFT WRIST TIMES ONE STICK, EXCELLENT BLOOD RETURN NOTED,
TOLERATED WELL, SECURED WITH TEGADERM AND TAPE. ICE PROVIDED. DENIES
ADDITIONAL NEEDS AND PAIN. REFUSES TELEMETRY, STATING THAT THE ELECTRODES ARE
"TEARING MY SKIN UP AND ITCHING, EVEN WITH Y'ALL CHANGING THEM." TELEMETRY
RETURNED TO MONITOR TECH. REFUSES SCD'S. BED IN LOW POSITION WITH UPPER SIDE
RAILS RAISED X2. CALL LIGHT AND PHONE WITHIN REACH. WILL CONT TO MONITOR.

## 2019-07-21 NOTE — NUR
RESTING WITH EYES CLOSED IN SEMIFOWLERS POSITION. RESPIRATIONS REGULAR RATE
AND RYMTHM, NO S/S OF DISTRESS NOTED. SIGNIFICANT OTHER RESTING ON COUCH AT
BEDSIDE. BED IN LOW POSITION WITH UPPER SIDE RAILS RAISED X2. CALL LIGHT AND
PHONE WITHIN REACH. WILL CONT TO MONITOR.

## 2019-07-21 NOTE — NUR
UP TO SHOWER. LINENS CHANGED. PT REPORTS THAT SHE IS GOING TO WEAR HER OWN
CLOTHES FOLLOWING SHOWER. SIGNIFICANT OTHER IN ROOM. DENIES NEEDS. NO SOB WITH
ACTIVITY NOTED. STEADY GAIT. WILL CONTINUE TO MONITOR.

## 2019-07-21 NOTE — NUR
DR PAREKH HERE, REVIEWED CBC RESULT HX, NOW TALKING TO PT. INFORMED MD THAT
PER DR HUMMEL, PLAN DC HOME TOMORROW ON OMNICEF 300 MG PO, WILL NEED CASE
MANAGEMENT CONSULT TO HELP PT IDENTIFY PAYMENT OPTIONS SINCE MEDICAID PENDING.

## 2019-07-21 NOTE — NUR
MARY IN LABL NOTIFIED OF NEED FOR VANC TROUGH TO BE DRAWN AND NEXT SCHEDULED
DOSE OF VANC IS AT 0600. PER MARY SHE WILL NOTIFY PHELBO OF NEED FOR TIMED
LABS TO BE DRAWN.

## 2019-07-21 NOTE — NUR
BACK TO PT. ROOM TO CHECK TELEMETRY UNIT. PT. AWAKENED AGAIN.
MONITOR STATION CALLED AND STEPS
TAKEN TO CORRECT OVER THE PHONE. MONITOR TECH STATES TELEMETRY IS NOW PICKING
UP.  FOB LYING ON SOFA LOOKING AT TABLET DEVICE.

## 2019-07-21 NOTE — NUR
SPOKE WITH DR. PAREKH REGARDING GESTATION AND CURRENT ORDER FOR LOVENOX.
ORDERS REC'D TO D/C LOVENOX. DR. PAREKH ALSO NOTIFIED OF PT REFUSAL TO WEAR
SCD'S AND PT INSISTING WITH STAFF THAT SCD'S WERE PAIN CONTROL DESPITE STAFF
TRYING TO EDUCATE OTHERWISE.

## 2019-07-21 NOTE — NUR
MONITOR STATION CALLED UNIT TO STATE PT. OFF MONITOR. INTO ROOM AND PT.
AWAKENED AND ELECTRODES CHECKED AND FOUND TO BE INTACT. MONITOR STATION
RECALLED AND INFORMED.  CONTINUES TO STATE TELEMETRY IS NOT WORKING.

## 2019-07-21 NOTE — NUR
EFM AND TOCO PLACED FOR NST. PT REQUESTING TO VIEW MEDICAL RECORDS WITH RN. RN
INSTRUCTED PT THAT SHE WOULD HAVE TO SIGN A RELEASE AND GET RECORDS FROM
MEDICAL RECORDS IF SHE WISHED TO SEE THEM, VERBALIZES UNDERSTANDING. DENIES
ADDITIONAL NEEDS. BED IN LOW POSITION WITH UPPER SIDE RAILS RAISED X2. CALL
LIGHT AND PHONE WITHIN REACH. WILL CONT TO MONITOR.

## 2019-07-21 NOTE — NUR
BEDSIDE REPORT REC'D FROM NOHEMI GERONIMO RN. PT REC'D SITTING UP IN BED Israeli STYLE
DOING UPDRAFT WITH RT. DENIES NEEDS AT THIS TIME. SIGNIFICANT OTHER AT
BEDSIDE. BED IN LOW POSITION WITH UPPER SIDE RAILS RAISED X2. CALL LIGHT AND
PHONE WITHIN REACH. WILL CONTINUE TO MONITOR. Discussion/Summary   The USs of the breasts were normal.        Verified Results  MA US BREAST SCREEN DAVIE [id number] 03:14PM ISABELLA DUFF   Ordering Provider: ISABELLA DUFF.    Reason For Study: dense breasts, changed from abus to handheld,dense breasts,dense breasts, changed from abus to handheld.   [Dec 4, 2017 9:55AM ISABELLA DUFF]  The USs of the breasts were normal.     Test Name Result Flag Reference   MA US BREAST SCREEN DAVIE (Report)     Accession #    [id number]    #[id number] - MA US BREAST SCREEN DAVIE    COMPLETE SCREENING ULTRASOUND OF BOTH BREASTS: 12/1/2017    CLINICAL HISTORY:Dense breasts (asymptomatic screen).    COMPARISON:   Comparison is made to exams dated: 11/18/2017 mammogram, 10/29/2016 mammogram, 8/21/2015   mammogram, 5/21/2014 mammogram, and 10/22/2011 mammogram - [facility].    FINDINGS:  Real-time ultrasound of both breasts four quadrants and retroareolar regions was performed. Gray   scale images of the real-time examination were reviewed.    No suspicious abnormalities were seen sonographically in either breast. A few benign complicated   cysts are seen in each breast.    IMPRESSION: BENIGN  There is no sonographic evidence of malignancy.  A 1 year screening mammogram and an ultrasound is recommended.    ULTRASOUND BI-RADS: 2 BENIGN    Jorge Musa M.D.  lm/:12/1/2017 15:46:19    Imaging Technologist: Nikolay Marinelli RDMS,T, [facility]  letter sent: Normal Single Exam  [id number]     **** FINAL ****    Dictated By:   JORGE BASS    Electronically Reviewed and Approved By:  JORGE BASS       Message   The USs of the breasts were normal.

## 2019-07-22 NOTE — NUR
REMAINS STABLE IN BED WITH NO SIGNS OF RESP DISTRESS OR OTHER DISTRESS NOTED
OR REPORTS. SKIN WARM DRY AND PINK. AWAKENED FOR MEDS. WAS SLEEPING ON RIGHT
SIDE

## 2019-07-22 NOTE — NUR
PT DISCHARGE VIA  HOME WITH ALL BELONGINGS AND DISCHARGE PAPERWORK, FOB
OUT AND OPENED CAR DOOR FOR PT, PT STATES "I'LL SEE YOU NEXT WEEK"

## 2019-07-22 NOTE — MORECARE
CASE MANAGEMENT DISCHARGE SUMMARY
 
 
PATIENT: JANINE OTT                UNIT: [id number]
ACCOUNT#: [id number]                       ADM DATE: 19
AGE: 23     : [birth year]  SEX: F            ROOM/BED: D.1273    
AUTHOR: NAIN TEJEDA                             PHYSICIAN:                               
 
REFERRING PHYSICIAN: IAIN RASHEED MD        
DATE OF SERVICE: 19
Discharge Plan
 
 
Patient Name: JANINE OTT
Facility: [facility]:[place]
Encounter #: [id number]
Medical Record #: [id number]
: 1995
Planned Disposition: 
Anticipated Discharge Date: 
 
Discharge Date: 
Expected LOS: 
Initial Reviewer: [id number]
Initial Review Date: 2019
Generated: 19  10:38 am 
Comments
 
DCP- Discharge Planning
 
Updated by CARMITA: Katie Conteh on 19   8:27 am CT
Patient Name: JANINE OTT                                     
Admission Status: Elective   
Accout number: [id number]                              
Admission Date: 2019   
: 1995                                                        
Admission Diagnosis:   
Attending: IAIN RASHEED                                                
Current LOS:  6   
  
Anticipated DC Date:    
Planned Disposition:    
Primary Insurance: MEDICAID [place]END[place]   
  
  
Discharge Planning Comments: MEDS PROVIDED THRU CM AT [facility] IN AMOUNT OF 21.
87\   OMNICEFT 300 BID X10
 
  
  
 
  
  
  
  
: Katie Conteh
  
 
 
 
 
 
 
 
 
Last DP export: 19  12:53 p
Patient Name: JANINE OTT
Encounter #: [id number]
Page [id number]
 
 
 
 
 
Electronically Signed by NAIN TEJEDA on 19 at 0939
 
 
 
 
 
 
**All edits/amendments must be made on the electronic document**
 
DICTATION DATE: 19     : ARIAS  19     
RPT#: [id number]                                DC DATE:        
                                               STATUS: ADM IN  
[facility]
[address] [place] [address]
***END OF REPORT***

## 2019-07-22 NOTE — NUR
SITTING CROSS LEGGED IN BED EATING BREAKFAST. SKIN WARM DRY AND SLIGHTLY PALE.
LIPS PINK. NO RESP DISTRESS OR OTHER DISTRESS NOTED OR REPORTED. BBS =
AND CTA. REPORTS COUGHING PRODUCES THIN CLEAR PHLEGM WHICH DOES HAVE SOME
BLOOD AND THAT MD IS AWARE OF SAME. REPORTS THAT SHE IS USING INCENTIVE
SPIROMETER HOURLY. DENIES SMOKING CIGARETTES FOR 3 MONTHS. STATES SHE DOES NOT
NEED NICOTENE PATCH.  ALERT AND ORIENTED X 4. COMPLAINS OF NO PAIN NOW BUT
STATES WHEN SHE LIES ON LEFT SIDE THAT SHE HAS PELVIC PAIN SHOOTING DOWN TO
VAGINA AT A LEVEL 10 ON 0-10 SCALE WHICH IS RELIEVED WHEN SHE GETS OFF OF HER
LEFT SIDE. NIGHT NURSE REPORTING PATIENT SLEPT ON LEFT SIDE MAJORITY OF NIGHT.
MAE. HOMANS NEGATIVE BLE. SALINE LOCK INTACT LEFT WRIST WITH NO SIGNS OF
REDNESS, SWELLING, DRAINAGE OR FEVER. REPORTS SWELLING TO RIGHT FOREARM
PREVIOUS IV SITE WHICH IS NOTED WITH NO REDNESS OR FEVER BUT IS SLIGHTLY
SWOLLEN. VSS.

## 2019-07-22 NOTE — MORECARE
CASE MANAGEMENT DISCHARGE SUMMARY
 
 
PATIENT: JANINE OTT                UNIT: [id number]
ACCOUNT#: [id number]                       ADM DATE: 19
AGE: 23     : [birth year]  SEX: F            ROOM/BED: D.1273    
AUTHOR: NAIN TEJEDA                             PHYSICIAN:                               
 
REFERRING PHYSICIAN: IAIN RASHEED MD        
DATE OF SERVICE: 19
Discharge Plan
 
 
Patient Name: JANINE OTT
Facility: [facility]:[place]
Encounter #: [id number]
Medical Record #: [id number]
: 1995
Planned Disposition: 
Anticipated Discharge Date: 
 
Discharge Date: 
Expected LOS: 
Initial Reviewer: [id number]
Initial Review Date: 2019
Generated: 19   4:06 pm 
Comments
 
DCP- Discharge Planning
 
Updated by CARMITA: Katie Conteh on 19   8:27 am CT
Patient Name: JANINE OTT                                     
Admission Status: Elective   
Accout number: [id number]                              
Admission Date: 2019   
: 1995                                                        
Admission Diagnosis:   
Attending: IAIN RASHEED                                                
Current LOS:  6   
  
Anticipated DC Date:    
Planned Disposition:    
Primary Insurance: MEDICAID [place]END[place]   
  
  
Discharge Planning Comments: MEDS PROVIDED THRU CM AT [facility] IN AMOUNT OF 21.
87\   OMNICEFT 300 BID X10
 
  
  
 
  
  
  
  
: Katie Conteh
  
 
 
 
 
 
 
 
 
Last DP export: 19   8:39 a
Patient Name: JANINE OTT
Encounter #: [id number]
Page [id number]
 
 
 
 
 
Electronically Signed by NAIN TEJEDA on 19 at 1506
 
 
 
 
 
 
**All edits/amendments must be made on the electronic document**
 
DICTATION DATE: 19 1505     : ARIAS  19 1505     
RPT#: [id number]                                DC DATE:        
                                               STATUS: ADM IN  
[facility]
[address] [place] [address]
***END OF REPORT***

## 2019-07-22 NOTE — NUR
1.25 GM VANC HUNG TO INFUSE OVER 1 HOUR PER ORDER TO LT WRIST PIV, NO S/S OF
INFILTRATION NOTED. B/P RECHECK DONE, 110/60. O2 SAT 96% PER RT. DENIES NEEDS
AT THIS TIME. SIGNIFICANT OTHER RESTING ON COUCH AT BEDSIDE. BED IN LOW
POSITION WITH UPPER SIDE RAILS RAISED X2. CALL LIGHT AND PHONE WITHIN REACH.

## 2019-07-22 NOTE — NUR
RESTING QUIETLY WITH EYES CLOSED IN SEMI-FOWLERS POSITION. RESPIRATIONS
REGULAR AND UNLABORED WITH NO S/S OF DISTRESS OF NOTED. SIGNIFICANT OTHER
RESTING ON COUCH. BED IN LOW POSITION WITH UPPER SIDE RAILS RAISED X2. CALL
LIGHT AND PHONE WITHIN REACH. WILL CONTINUE TO MONITOR.

## 2019-07-22 NOTE — NUR
RESTING QUIETLY LAYING ON LEFT SIDE RESTING WITH EYES CLOSED. RESPIRATIONS
REGULAR AND UNLABORED, NO S/S OF DISTRESS NOTED. BED IN LOW POSITION WITH
UPPER SIDE RAILS RAISED X2. CALL LIGHT AND PHONE WITHIN REACH. SIGNIFICANT
OTHER RESTING ON COUCH. WILL CONTINUE TO MONITOR.

## 2019-07-22 NOTE — NUR
SALINE LOCK REMOVED, TIP INTACT, PRESSURE HELD, BANDAID APPLIED, DISCHARGE
INST GONE OVER WITH PT, PT VERBALIZES UNDERSTANDING, QUESTIONS ANSWERED, FOB
TO GET CAR

## 2019-07-22 NOTE — NUR
VANC INFUSION AND FLUSH COMPLETED. L WRIST PIV SL. NO S/S OF INFILTRATION
NOTED. PT RESTING QUIETLY LAYING ON LEFT WITH EYES CLOSED, AROUSES EASILY TO
VOICE AND LIGHT STIMULI. DENIES NEEDS. BED IN LOW POSITION WITH UPPER SIDE
RAILS RAISED X2. CALL LIGHT AND PHONE WITHIN REACH. WILL CONTINUE TO MONITOR.
SIGNIFICANT OTHER RESTING ON COUCH AT BEDSIDE.

## 2019-07-25 NOTE — NUR
PT TO NBN TO GET INFANT TO BRING TO ROOM. DENIES NEEDS AND PAIN AT THIS TIME.
WILL CONTINUE TO MONITOR.

## 2019-07-25 NOTE — NUR
AMBULATORY ON UNIT WITH SIGNIFICANT OTHER, STEADY GAIT NOTED. DENIES NEEDS.
WILL CONTINUE TO MONITOR.

## 2019-07-25 NOTE — NUR
SHIFT ASSESSMENT COMPLETED PER FLOWSHEET. VSS. FUNDUS FIRM MIDLINE AND U2 WITH
SCANT RUBRA LOCHIA, NO CLOTS NOTED. PT REPORTS THAT SHE IS PASSING FLATUS AND
VOIDING WITHOUT DIFFICULTY. STATES THAT SHE IS USING PERIBOTTLE AFTER USING
BATHROOM. REPORTS THAT SHE HAS ALREADY SHOWERED, LINENS CHANGED AT THIS TIME.
EDUCATION PROVIDED PER FLOWSHEET. INFANT BACK TO NBN PER PT REQUEST FOR HER
AMBULATE ON UNIT. WILL CONTINUE TO MONITOR.

## 2019-07-25 NOTE — NUR
TOWELS AND SHOWER ITEMS PLACED IN BATHROOM FOR PT TO BE ABLE TO SHOWER WHEN
READY.  WILL CALL SO THAT BED LINENS CAN BE CHANGED.

## 2019-07-25 NOTE — NUR
DR MARRUFO NOTIFIED AND REVIEWED PT'S BEHAVIOR AND ASSESSMENT RESULTS. PT IS A
LOW RISK PER DR MARRUFO. DR MARRUFO STATED TO GIVE RESOURCES TO PT AT TIME OF
DISCHARGE. NO FURTHER ORDERS AT THIS TIME. RESOURCES REVIEWED WITH PT AND SHE
VERBALIZED UNDERSTANDING.

## 2019-07-25 NOTE — NUR
INFANT REMAINS IN ROOM WITH MOM. RESTING QUEITLY IN OPEN CRIB, SWADDLED IN ONE
BLANKET, HAT ON. RESP REGULAR AND UNLABORED, NO S/S OF DISTRESS NOTED. COLOR
WNL. SKIN WARM AND DRY. WILL CONTINUE TO MONITOR.

## 2019-07-25 NOTE — NUR
PAIN REASSESSMENT COMPLETED. REPORTS THAT PAIN IS MUCH RELIEVED, 1/10. DENIES
NEED FOR ADDITIONAL INTERVENTIONS. SIGNIFICANT OTHER AT BEDSIDE, SUPPORTIVE
AND ATTENTIVE TO PT AND INFANT. INFANT RESTING QUIETLY IN OPEN CRIB SWADDLED
WITH HAT ON. BED IN LOW POSITION WITH UPPER SIDE RAILS RAISED X2. CALL LIGHT
AND PHONE WITHIN REACH. WILL CONTINUE TO MONITOR.

## 2019-07-25 NOTE — NUR
ASSESSMENT COMPLETED.  DENIES CLOTS WITH VOIDS AND STATES BLEEDING IS "VERY
LIGHT" SALINE LOCK REMOVED INTACT FROM LEFT WRIST, PT REQUESTED THIS AND C/O
OF ITCHING AT SITE. INFANT IN ROOM AND SHE DENIES NEEDS.

## 2019-07-25 NOTE — NUR
PREPARING TO GO TO BED. BLANKET AND PILLOW PROVIDED TO SIGNIFICANT OTHER PER
REQUEST. DENIES ADDITIONAL NEEDS. INFANT RESTING QUIETLY IN OPEN CRIB AT
BEDSIDE. BED IN LOW POSITION WITH UPPER SIDE RAILS RAISED X2. CALL LIGHT AND
PHONE WITHIN REACH. WILL CONTINUE TO MONITOR AND ASSIST PRN.

## 2019-07-26 NOTE — NUR
PAIN REASSESSMENT COMPLETED. LAYING ON RIGHT SIDE RESTING WITH EYES CLOSED.
RESP REGULAR AND UNLABORED, NO S/S OF DISTRESS NOTED. SIGNIFICANT OTHER
RESTING ON COUCH AT BEDSIDE. BED IN LOW POSITION WITH UPPER SIDE RAILS RAISED
X2. CALL LIGHT AND PHONE WITHIN REACH. WILL CONTINUE TO MONITOR AND ASSIST
PRN.

## 2019-07-26 NOTE — NUR
PT GIVEN DISCHARGE INSTRUCTIONS AS WELL AS WRITTEN PRESCRIPTIONS FOR PAIN
CONTROL POST DISCHARGE TO HOME. PT ALSO INSTRUCTED TO CALL [facility] MONDAY
TO SCHEDULE 4 WEEK PP FOLLOW UP APPOINTMENT. UNDERSTANDING VERBALIZED AND PT
DENIES QUESTIONS. PT SIGNS CHART COPIES ON INSTRUCTIONS. PT INSTRUCTED TO
BUCKLE INFANT IN CARSEAT AND CALL WHEN READY FOR W/C OUT.

## 2019-07-26 NOTE — NUR
THIS RN TO ROOM FOR PAIN REASSESSMENT. PT AMBULATING IN BR, STATES PAIN LEVEL
IS "LOW", RATES 1-2/10. PT REQUESTS PADS AND PANTIES, PROVIDED TO PT AS
REQUESTED. PT DENIES FURTHER NEEDS AT THIS TIME. WILL CONT TO MONITOR.

## 2019-07-26 NOTE — NUR
LAYING ON LEFT SIDE RESTING WITH EYES CLOSED. RESPIRATIONS REGULAR AND
UNLABORED, NO S/S OF DISTRESS NOTED. SIGNIFICANT OTHER RESTING ON COUCH AT
BEDSIDE. INFANT IN NBN. BED IN LOW POSITION WITH UPPER SIDE RAILS RAISED X2.
CALL LIGHT AND PHONE WITHIN REACH. WILL CONTINUE TO MONITOR AND ASSIST PRN.

## 2019-07-26 NOTE — NUR
THIS RN TO ROOM FOR PAIN REASSESS. PT STATES PAIN ISN'T REALLY BETTER, RATES
CRAMPING 6/10. PT REQUESTS ADDITIONAL PAIN MED. PRN PAIN MED ADMIN AS ORDERED,
SEE EMAR FOR DOC. PT DENIES FURTHER NEEDS, RESUMES FEEDING INFANT. SRUx2, CL
IN REACH. WILL CONT TO MONITOR.

## 2019-07-26 NOTE — NUR
AMBULATORY ON UNIT WITH SIGNIFICANT OTHER. STEADY GAIT NOTED. DENIES NEEDS.
WILL CONTINUE TO MONITOR.

## 2019-07-26 NOTE — NUR
PT CALLS OUT ON CALL LIGHT FOR BOTTLE OF FORMULA FOR INFANT. PROVIDED TO PT AS
REQUESTED. PT SITTING UP IN BED EATING LUNCH, INFANT RESTING IN BASINETTE. PT
DENIES FURTHER NEEDS. SRUx2, CL IN REACH.

## 2019-07-26 NOTE — NUR
C/O ABD CRAMPING 6/10. NORCO AND MOTRIN GIVEN PER ORDER AND PT REQUEST. ICE
WATER PROVIDED. DENIES ADDITIONAL NEEDS. SIGNIFICANT OTHER RESTING ON COUCH AT
BEDSIDE. BED IN LOW POSITION WITH UPPER SIDE RAILS RAISED X2. CALL LIGHT AND
PHONE WITHIN REACH. WILL CONTINUE TO MONITOR AND ASSIST PRN.

## 2019-07-26 NOTE — NUR
PT CALLS OUT ON CALL LIGHT FOR ASSIST WITH BABY. THIS RN TO ROOM. INFANT NOTED
TO HAVE SPIT UP ON SHIRT AND BLANKET, AND IS CRYING. NEW SHIRT AND BLANKETS
PROVIDED, PT CHANGES INFANT'S SHIRT AND THIS RN SWADDLES INFANT. PT SOOTHES
INFANT AND HOLDS, INFANT CALMS. PT REASSURED, DENIES FURTHER NEEDS. SRUx2, CL
IN REACH.

## 2019-07-26 NOTE — NUR
STATES THAT SHE IS GOING TO GO WALKING ON UNIT. REQUESTS INFANT BE TAKEN TO
NBN. DENIES ADDITIONAL NEEDS AT THIS TIME. BED IN LOW POSITION WITH UPPER SIDE
RAILS RAISED X2. CALL LIGHT AND PHONE WITHIN REACH. WILL CONTINUE TO MONITOR.

## 2019-07-26 NOTE — NUR
DR RASHEED ROUNDING ON PT, INFORMING OF DISCHARGE TO HOME. WRITTEN
PRESCRIPTIONS PROVIDED ON PT CHART FOR DISCHARGE.

## 2019-07-26 NOTE — NUR
PT RETURNS TO ROOM FROM AMBULATING. THIS RN TO ROOM. SHIFT ASSESSMENT
COMPLETED, VSS, SEE FLOWSHEET FOR DOC. PT RATES PAIN APPROX 4/10 AT THIS TIME,
C/O CRAMPING AND REQUESTS PAIN MED. FF, ML, U/2. SMALL RUBRA LOCHIA WITHOUT
CLOTS NOTED TO PERIPAD. LOCHIA FLOW AND S/S TO REPORT DISCUSSED WITH PT,
UNDERSTANDING VERBALIZED. PT DENIES HEAVY LOCHIA CONCERNS, STATES "IT'S
ACTUALLY BEEN REALLY LIGHT." POC DISCUSSED WITH PT, UNDERSTANDING VERBALIZED.
WILL ADMIN PRN PAIN MED AS ORDERED, SEE EMAR FOR DOC.

## 2019-11-11 NOTE — NUR
DR MARRUFO NOTIFIED OF PT'S BEHAVIOR AND ASSESSMENT RESULTS. PT IS A LOW RISK
PER DR MARRUFO. DR MARRUFO SAID TO GIVE RESOURCES AT TIME OF DISCHARGE.
RESOURCES GIVEN AND REVIEWED WITH PT AND SHE VERBALIZES UNDERSTANDING.

## 2020-05-12 ENCOUNTER — HOSPITAL ENCOUNTER (EMERGENCY)
Dept: HOSPITAL 84 - D.ER | Age: 25
Discharge: HOME | End: 2020-05-12
Payer: MEDICAID

## 2020-05-12 VITALS — DIASTOLIC BLOOD PRESSURE: 74 MMHG | SYSTOLIC BLOOD PRESSURE: 128 MMHG

## 2020-05-12 VITALS — WEIGHT: 130.27 LBS | BODY MASS INDEX: 23.97 KG/M2 | HEIGHT: 62 IN

## 2020-05-12 DIAGNOSIS — R30.9: ICD-10-CM

## 2020-05-12 DIAGNOSIS — R30.0: ICD-10-CM

## 2020-05-12 DIAGNOSIS — R10.9: ICD-10-CM

## 2020-05-12 DIAGNOSIS — R31.9: Primary | ICD-10-CM

## 2020-05-12 DIAGNOSIS — M54.5: ICD-10-CM

## 2020-05-12 LAB
ALBUMIN SERPL-MCNC: 3.7 G/DL (ref 3.4–5)
ALP SERPL-CCNC: 71 U/L (ref 30–120)
ALT SERPL-CCNC: 19 U/L (ref 10–68)
AMYLASE SERPL-CCNC: 36 U/L (ref 25–115)
ANION GAP SERPL CALC-SCNC: 15.5 MMOL/L (ref 8–16)
BACTERIA #/AREA URNS HPF: (no result) /HPF
BILIRUB SERPL-MCNC: 0.42 MG/DL (ref 0.2–1.3)
BILIRUB SERPL-MCNC: NEGATIVE MG/DL
BUN SERPL-MCNC: 14 MG/DL (ref 7–18)
CALCIUM SERPL-MCNC: 8.2 MG/DL (ref 8.5–10.1)
CHLORIDE SERPL-SCNC: 107 MMOL/L (ref 98–107)
CO2 SERPL-SCNC: 24.2 MMOL/L (ref 21–32)
CREAT SERPL-MCNC: 0.8 MG/DL (ref 0.6–1.3)
ERYTHROCYTE [DISTWIDTH] IN BLOOD BY AUTOMATED COUNT: 13.2 % (ref 11.5–14.5)
GLOBULIN SER-MCNC: 2.7 G/L
GLUCOSE SERPL-MCNC: 80 MG/DL (ref 74–106)
GLUCOSE SERPL-MCNC: NEGATIVE MG/DL
HCG SERPL-ACNC: NEGATIVE M[IU]/ML
HCT VFR BLD CALC: 40.7 % (ref 36–48)
HGB BLD-MCNC: 13.7 G/DL (ref 12–16)
KETONES UR STRIP-MCNC: NEGATIVE MG/DL
LIPASE SERPL-CCNC: 108 U/L (ref 73–393)
LYMPHOCYTES NFR BLD AUTO: 23.3 % (ref 15–50)
MCH RBC QN AUTO: 31.7 PG (ref 26–34)
MCHC RBC AUTO-ENTMCNC: 33.7 G/DL (ref 31–37)
MCV RBC: 94.2 FL (ref 80–100)
NEUTROPHILS NFR BLD AUTO: 67.2 % (ref 40–80)
NITRITE UR-MCNC: NEGATIVE MG/ML
OSMOLALITY SERPL CALC.SUM OF ELEC: 284 MOSM/KG (ref 275–300)
PH UR STRIP: 5 [PH] (ref 5–6)
PLATELET # BLD: 283 10X3/UL (ref 130–400)
PMV BLD AUTO: 10.1 FL (ref 7.4–10.4)
POTASSIUM SERPL-SCNC: 3.7 MMOL/L (ref 3.5–5.1)
PROT SERPL-MCNC: 6.4 G/DL (ref 6.4–8.2)
RBC # BLD AUTO: 4.32 10X6/UL (ref 4–5.4)
RBC #/AREA URNS HPF: >50 /HPF (ref 0–5)
SODIUM SERPL-SCNC: 143 MMOL/L (ref 136–145)
SP GR UR STRIP: 1.02 (ref 1–1.02)
SQUAMOUS #/AREA URNS HPF: (no result) /HPF (ref 0–5)
UROBILINOGEN UR-MCNC: 1 MG/DL
WBC # BLD AUTO: 7.2 10X3/UL (ref 4.8–10.8)
WBC #/AREA URNS HPF: (no result) /HPF

## 2020-07-15 ENCOUNTER — HOSPITAL ENCOUNTER (EMERGENCY)
Dept: HOSPITAL 84 - D.ER | Age: 25
Discharge: HOME | End: 2020-07-15
Payer: COMMERCIAL

## 2020-07-15 VITALS — DIASTOLIC BLOOD PRESSURE: 82 MMHG | SYSTOLIC BLOOD PRESSURE: 105 MMHG

## 2020-07-15 VITALS
HEIGHT: 62 IN | WEIGHT: 136.29 LBS | HEIGHT: 62 IN | BODY MASS INDEX: 25.08 KG/M2 | WEIGHT: 136.29 LBS | BODY MASS INDEX: 25.08 KG/M2

## 2020-07-15 DIAGNOSIS — R10.30: ICD-10-CM

## 2020-07-15 DIAGNOSIS — O20.0: Primary | ICD-10-CM

## 2020-07-15 DIAGNOSIS — Z3A.01: ICD-10-CM

## 2020-07-15 LAB
ALBUMIN SERPL-MCNC: 3.3 G/DL (ref 3.4–5)
ALP SERPL-CCNC: 60 U/L (ref 30–120)
ALT SERPL-CCNC: 14 U/L (ref 10–68)
ANION GAP SERPL CALC-SCNC: 11 MMOL/L (ref 8–16)
APTT BLD: 29.6 SECONDS (ref 22.8–39.4)
BILIRUB SERPL-MCNC: 0.18 MG/DL (ref 0.2–1.3)
BILIRUB SERPL-MCNC: NEGATIVE MG/DL
BUN SERPL-MCNC: 9 MG/DL (ref 7–18)
CALCIUM SERPL-MCNC: 8.6 MG/DL (ref 8.5–10.1)
CHLORIDE SERPL-SCNC: 103 MMOL/L (ref 98–107)
CO2 SERPL-SCNC: 26.4 MMOL/L (ref 21–32)
CREAT SERPL-MCNC: 0.6 MG/DL (ref 0.6–1.3)
ERYTHROCYTE [DISTWIDTH] IN BLOOD BY AUTOMATED COUNT: 13 % (ref 11.5–14.5)
GLOBULIN SER-MCNC: 2.9 G/L
GLUCOSE SERPL-MCNC: 86 MG/DL (ref 74–106)
GLUCOSE SERPL-MCNC: NEGATIVE MG/DL
HCG SERPL-ACNC: (no result) MIU/ML
HCT VFR BLD CALC: 37 % (ref 36–48)
HGB BLD-MCNC: 12.8 G/DL (ref 12–16)
INR PPP: 0.96 (ref 0.85–1.17)
KETONES UR STRIP-MCNC: NEGATIVE MG/DL
LYMPHOCYTES NFR BLD AUTO: 23.7 % (ref 15–50)
MCH RBC QN AUTO: 32 PG (ref 26–34)
MCHC RBC AUTO-ENTMCNC: 34.6 G/DL (ref 31–37)
MCV RBC: 92.5 FL (ref 80–100)
NEUTROPHILS NFR BLD AUTO: 68.3 % (ref 40–80)
NITRITE UR-MCNC: NEGATIVE MG/ML
OSMOLALITY SERPL CALC.SUM OF ELEC: 271 MOSM/KG (ref 275–300)
PH UR STRIP: 7 [PH] (ref 5–6)
PLATELET # BLD: 292 10X3/UL (ref 130–400)
PMV BLD AUTO: 9.9 FL (ref 7.4–10.4)
POTASSIUM SERPL-SCNC: 3.4 MMOL/L (ref 3.5–5.1)
PROT SERPL-MCNC: 6.2 G/DL (ref 6.4–8.2)
PROTHROMBIN TIME: 12.8 SECONDS (ref 11.6–15)
RBC # BLD AUTO: 4 10X6/UL (ref 4–5.4)
SODIUM SERPL-SCNC: 137 MMOL/L (ref 136–145)
SP GR UR STRIP: 1.01 (ref 1–1.02)
UROBILINOGEN UR-MCNC: NORMAL MG/DL
WBC # BLD AUTO: 11.2 10X3/UL (ref 4.8–10.8)

## 2020-07-15 NOTE — NUR
DR MARRUFO NOTIFIED AND REVIEWED PT'S BEHAVIOR AND ASSESDSMENT RESULTS. PT IS
A LOW RISK PER DR MARRUFO. DR MARRUFO STATED TO GIVE RESOURCES TO PT AT TIME
OF DISCHARGE. NO FURTHER ORDERS AT THIS TIME. RESOURCES REVIEWED WITH PT AND
HE VERBALIZED UNDERSTANDING.

## 2020-09-10 ENCOUNTER — HOSPITAL ENCOUNTER (EMERGENCY)
Dept: HOSPITAL 84 - D.ER | Age: 25
Discharge: LEFT BEFORE DISCHARGE | End: 2020-09-10
Payer: COMMERCIAL

## 2020-09-10 VITALS — DIASTOLIC BLOOD PRESSURE: 71 MMHG | SYSTOLIC BLOOD PRESSURE: 116 MMHG

## 2020-09-10 VITALS — WEIGHT: 132.28 LBS | HEIGHT: 62 IN | BODY MASS INDEX: 24.34 KG/M2

## 2020-09-10 DIAGNOSIS — R10.2: Primary | ICD-10-CM

## 2020-09-10 LAB
ALBUMIN SERPL-MCNC: 3.3 G/DL (ref 3.4–5)
ALP SERPL-CCNC: 71 U/L (ref 30–120)
ALT SERPL-CCNC: 13 U/L (ref 10–68)
ANION GAP SERPL CALC-SCNC: 7.6 MMOL/L (ref 8–16)
BACTERIA #/AREA URNS HPF: (no result) /HPF
BASOPHILS NFR BLD AUTO: 0.2 % (ref 0–2)
BILIRUB SERPL-MCNC: 0.52 MG/DL (ref 0.2–1.3)
BILIRUB SERPL-MCNC: NEGATIVE MG/DL
BUN SERPL-MCNC: 9 MG/DL (ref 7–18)
CALCIUM SERPL-MCNC: 8.2 MG/DL (ref 8.5–10.1)
CHLORIDE SERPL-SCNC: 105 MMOL/L (ref 98–107)
CO2 SERPL-SCNC: 28.8 MMOL/L (ref 21–32)
CREAT SERPL-MCNC: 0.8 MG/DL (ref 0.6–1.3)
EOSINOPHIL NFR BLD: 1.4 % (ref 0–7)
ERYTHROCYTE [DISTWIDTH] IN BLOOD BY AUTOMATED COUNT: 13.2 % (ref 11.5–14.5)
GLOBULIN SER-MCNC: 3.4 G/L
GLUCOSE SERPL-MCNC: 85 MG/DL (ref 74–106)
HCG UR QL: NEGATIVE
HCT VFR BLD CALC: 38.2 % (ref 36–48)
HGB BLD-MCNC: 12.5 G/DL (ref 12–16)
IMM GRANULOCYTES NFR BLD: 0.2 % (ref 0–5)
INR PPP: 1.09 (ref 0.85–1.17)
KETONES UR STRIP-MCNC: NEGATIVE MG/DL
LYMPHOCYTES NFR BLD AUTO: 17.3 % (ref 15–50)
MCH RBC QN AUTO: 30.8 PG (ref 26–34)
MCHC RBC AUTO-ENTMCNC: 32.7 G/DL (ref 31–37)
MCV RBC: 94.1 FL (ref 80–100)
MONOCYTES NFR BLD: 8.3 % (ref 2–11)
NEUTROPHILS NFR BLD AUTO: 72.6 % (ref 40–80)
NITRITE UR-MCNC: POSITIVE MG/ML
OSMOLALITY SERPL CALC.SUM OF ELEC: 273 MOSM/KG (ref 275–300)
PH UR STRIP: 6 [PH] (ref 5–6)
PLATELET # BLD: 255 10X3/UL (ref 130–400)
PMV BLD AUTO: 10.5 FL (ref 7.4–10.4)
POTASSIUM SERPL-SCNC: 3.4 MMOL/L (ref 3.5–5.1)
PROT SERPL-MCNC: 6.7 G/DL (ref 6.4–8.2)
PROTHROMBIN TIME: 14 SECONDS (ref 11.6–15)
RBC # BLD AUTO: 4.06 10X6/UL (ref 4–5.4)
SODIUM SERPL-SCNC: 138 MMOL/L (ref 136–145)
SQUAMOUS #/AREA URNS HPF: (no result) /HPF (ref 0–5)
UROBILINOGEN UR-MCNC: NORMAL MG/DL
WBC # BLD AUTO: 8.4 10X3/UL (ref 4.8–10.8)
WBC #/AREA URNS HPF: (no result) /HPF (ref 0–5)

## 2021-04-07 ENCOUNTER — HOSPITAL ENCOUNTER (EMERGENCY)
Dept: HOSPITAL 84 - D.ER | Age: 26
Discharge: HOME | End: 2021-04-07
Payer: COMMERCIAL

## 2021-04-07 VITALS — HEIGHT: 62 IN | WEIGHT: 119.25 LBS | BODY MASS INDEX: 21.94 KG/M2

## 2021-04-07 VITALS — SYSTOLIC BLOOD PRESSURE: 122 MMHG | DIASTOLIC BLOOD PRESSURE: 80 MMHG

## 2021-04-07 DIAGNOSIS — Z3A.11: ICD-10-CM

## 2021-04-07 DIAGNOSIS — O26.891: Primary | ICD-10-CM

## 2021-04-07 DIAGNOSIS — R10.30: ICD-10-CM

## 2021-04-07 LAB
ALBUMIN SERPL-MCNC: 3.4 G/DL (ref 3.4–5)
ALP SERPL-CCNC: 54 U/L (ref 30–120)
ALT SERPL-CCNC: 18 U/L (ref 10–68)
AMORPHOUS SEDIMENT: (no result) LPF
AMPHETAMINES UR QL SCN: NEGATIVE QUAL
ANION GAP SERPL CALC-SCNC: 12.9 MMOL/L (ref 8–16)
BACTERIA #/AREA URNS HPF: (no result) HPF
BARBITURATES UR QL SCN: NEGATIVE QUAL
BASOPHILS NFR BLD AUTO: 0.1 % (ref 0–2)
BENZODIAZ UR QL SCN: NEGATIVE QUAL
BILIRUB SERPL-MCNC: 0.27 MG/DL (ref 0.2–1.3)
BILIRUB SERPL-MCNC: NEGATIVE MG/DL
BUN SERPL-MCNC: 8 MG/DL (ref 7–18)
BZE UR QL SCN: NEGATIVE QUAL
CALCIUM SERPL-MCNC: 8.7 MG/DL (ref 8.5–10.1)
CANNABINOIDS UR QL SCN: POSITIVE QUAL
CHLORIDE SERPL-SCNC: 104 MMOL/L (ref 98–107)
CK SERPL-CCNC: 156 UL (ref 21–215)
CO2 SERPL-SCNC: 23.7 MMOL/L (ref 21–32)
CREAT SERPL-MCNC: 0.6 MG/DL (ref 0.6–1.3)
EOSINOPHIL NFR BLD: 0.5 % (ref 0–7)
ERYTHROCYTE [DISTWIDTH] IN BLOOD BY AUTOMATED COUNT: 13.3 % (ref 11.5–14.5)
GLOBULIN SER-MCNC: 3 G/L
GLUCOSE SERPL-MCNC: 77 MG/DL (ref 74–106)
GRAN CASTS #/AREA URNS LPF: (no result) LPF
HCG SERPL-ACNC: (no result) MIU/ML
HCT VFR BLD CALC: 35.4 % (ref 36–48)
HGB BLD-MCNC: 12.1 G/DL (ref 12–16)
IMM GRANULOCYTES NFR BLD: 0.4 % (ref 0–5)
KETONES UR STRIP-MCNC: NEGATIVE MG/DL
LYMPHOCYTES # BLD: 1.97 10X3/UL (ref 1.18–3.74)
LYMPHOCYTES NFR BLD AUTO: 15.2 % (ref 15–50)
MCH RBC QN AUTO: 31.5 PG (ref 26–34)
MCHC RBC AUTO-ENTMCNC: 34.2 G/DL (ref 31–37)
MCV RBC: 92.2 FL (ref 80–100)
MONOCYTES NFR BLD: 6.6 % (ref 2–11)
NEUTROPHILS # BLD: 10.01 10X3/UL (ref 1.56–6.13)
NEUTROPHILS NFR BLD AUTO: 77.2 % (ref 40–80)
NITRITE UR-MCNC: NEGATIVE MG/ML
OPIATES UR QL SCN: NEGATIVE QUAL
OSMOLALITY SERPL CALC.SUM OF ELEC: 272 MOSM/KG (ref 275–300)
PCP UR QL SCN: NEGATIVE QUAL
PH UR STRIP: 7 [PH] (ref 5–8)
PLATELET # BLD: 305 10X3/UL (ref 130–400)
PMV BLD AUTO: 10.7 FL (ref 7.4–10.4)
POTASSIUM SERPL-SCNC: 2.6 MMOL/L (ref 3.5–5.1)
PROT SERPL-MCNC: 6.4 G/DL (ref 6.4–8.2)
RBC # BLD AUTO: 3.84 10X6/UL (ref 4–5.4)
SODIUM SERPL-SCNC: 138 MMOL/L (ref 136–145)
SQUAMOUS #/AREA URNS HPF: (no result) HPF (ref 0–4)
UROBILINOGEN UR-MCNC: NORMAL MG/DL (ref ?–2)
WBC # BLD AUTO: 13 10X3/UL (ref 4.8–10.8)
WBC #/AREA URNS HPF: (no result) HPF (ref 0–4)

## 2021-04-12 ENCOUNTER — HOSPITAL ENCOUNTER (EMERGENCY)
Dept: HOSPITAL 84 - D.ER | Age: 26
Discharge: LEFT BEFORE DISCHARGE | End: 2021-04-12
Payer: COMMERCIAL

## 2021-04-12 VITALS — DIASTOLIC BLOOD PRESSURE: 72 MMHG | SYSTOLIC BLOOD PRESSURE: 132 MMHG

## 2021-04-12 VITALS — WEIGHT: 119.25 LBS | HEIGHT: 62 IN | BODY MASS INDEX: 21.94 KG/M2

## 2021-04-12 DIAGNOSIS — R10.9: Primary | ICD-10-CM

## 2021-04-12 DIAGNOSIS — Z53.29: ICD-10-CM

## 2021-04-12 LAB
BILIRUB SERPL-MCNC: NEGATIVE MG/DL
KETONES UR STRIP-MCNC: NEGATIVE MG/DL
NITRITE UR-MCNC: NEGATIVE MG/ML
PH UR STRIP: 6 [PH] (ref 5–8)
UROBILINOGEN UR-MCNC: NORMAL MG/DL (ref ?–2)